# Patient Record
Sex: FEMALE | NOT HISPANIC OR LATINO | URBAN - METROPOLITAN AREA
[De-identification: names, ages, dates, MRNs, and addresses within clinical notes are randomized per-mention and may not be internally consistent; named-entity substitution may affect disease eponyms.]

---

## 2022-05-11 ENCOUNTER — TELEPHONE (OUTPATIENT)
Dept: OBSTETRICS AND GYNECOLOGY | Facility: CLINIC | Age: 37
End: 2022-05-11

## 2022-05-11 NOTE — TELEPHONE ENCOUNTER
1st attempt- left voicemail. Patient being referred by her request. Unsure of reason for needing to be seen. Patient would be a new gyn due to patient never being seen at our office. Please schedule first available.

## 2023-08-14 ENCOUNTER — HOSPITAL ENCOUNTER (OUTPATIENT)
Facility: HOSPITAL | Age: 38
Setting detail: OBSERVATION
Discharge: PSYCHIATRIC OR SUBSTANCE USE FACILITY | DRG: 881 | End: 2023-08-16
Attending: EMERGENCY MEDICINE | Admitting: STUDENT IN AN ORGANIZED HEALTH CARE EDUCATION/TRAINING PROGRAM
Payer: COMMERCIAL

## 2023-08-14 DIAGNOSIS — T50.902A OVERDOSE, INTENTIONAL SELF-HARM, INITIAL ENCOUNTER: Primary | ICD-10-CM

## 2023-08-14 PROCEDURE — 80307 DRUG TEST PRSMV CHEM ANLYZR: CPT | Performed by: NURSE PRACTITIONER

## 2023-08-14 PROCEDURE — 36415 COLL VENOUS BLD VENIPUNCTURE: CPT

## 2023-08-14 PROCEDURE — 99285 EMERGENCY DEPT VISIT HI MDM: CPT

## 2023-08-14 PROCEDURE — 81001 URINALYSIS AUTO W/SCOPE: CPT

## 2023-08-14 PROCEDURE — G0378 HOSPITAL OBSERVATION PER HR: HCPCS

## 2023-08-15 PROBLEM — T50.901A OVERDOSE: Status: ACTIVE | Noted: 2023-08-15

## 2023-08-15 LAB
ALBUMIN SERPL-MCNC: 4.5 G/DL (ref 3.5–5.2)
ALBUMIN/GLOB SERPL: 1.5 G/DL
ALP SERPL-CCNC: 65 U/L (ref 39–117)
ALT SERPL W P-5'-P-CCNC: 14 U/L (ref 1–33)
AMPHET+METHAMPHET UR QL: NEGATIVE
ANION GAP SERPL CALCULATED.3IONS-SCNC: 11.4 MMOL/L (ref 5–15)
ANION GAP SERPL CALCULATED.3IONS-SCNC: 13.7 MMOL/L (ref 5–15)
APAP SERPL-MCNC: <5 MCG/ML (ref 0–30)
AST SERPL-CCNC: 16 U/L (ref 1–32)
BACTERIA UR QL AUTO: ABNORMAL /HPF
BARBITURATES UR QL SCN: NEGATIVE
BASOPHILS # BLD AUTO: 0.04 10*3/MM3 (ref 0–0.2)
BASOPHILS # BLD AUTO: 0.05 10*3/MM3 (ref 0–0.2)
BASOPHILS NFR BLD AUTO: 0.5 % (ref 0–1.5)
BASOPHILS NFR BLD AUTO: 0.7 % (ref 0–1.5)
BENZODIAZ UR QL SCN: NEGATIVE
BILIRUB SERPL-MCNC: 0.3 MG/DL (ref 0–1.2)
BILIRUB UR QL STRIP: NEGATIVE
BUN SERPL-MCNC: 5 MG/DL (ref 6–20)
BUN SERPL-MCNC: 6 MG/DL (ref 6–20)
BUN/CREAT SERPL: 6 (ref 7–25)
BUN/CREAT SERPL: 9.5 (ref 7–25)
CALCIUM SPEC-SCNC: 7.9 MG/DL (ref 8.6–10.5)
CALCIUM SPEC-SCNC: 8.2 MG/DL (ref 8.6–10.5)
CANNABINOIDS SERPL QL: NEGATIVE
CHLORIDE SERPL-SCNC: 105 MMOL/L (ref 98–107)
CHLORIDE SERPL-SCNC: 108 MMOL/L (ref 98–107)
CLARITY UR: CLEAR
CO2 SERPL-SCNC: 21.3 MMOL/L (ref 22–29)
CO2 SERPL-SCNC: 21.6 MMOL/L (ref 22–29)
COCAINE UR QL: NEGATIVE
COLOR UR: YELLOW
CREAT SERPL-MCNC: 0.63 MG/DL (ref 0.57–1)
CREAT SERPL-MCNC: 0.83 MG/DL (ref 0.57–1)
DEPRECATED RDW RBC AUTO: 41.6 FL (ref 37–54)
DEPRECATED RDW RBC AUTO: 43 FL (ref 37–54)
EGFRCR SERPLBLD CKD-EPI 2021: 115.9 ML/MIN/1.73
EGFRCR SERPLBLD CKD-EPI 2021: 92.1 ML/MIN/1.73
EOSINOPHIL # BLD AUTO: 0.1 10*3/MM3 (ref 0–0.4)
EOSINOPHIL # BLD AUTO: 0.1 10*3/MM3 (ref 0–0.4)
EOSINOPHIL NFR BLD AUTO: 1.2 % (ref 0.3–6.2)
EOSINOPHIL NFR BLD AUTO: 1.4 % (ref 0.3–6.2)
ERYTHROCYTE [DISTWIDTH] IN BLOOD BY AUTOMATED COUNT: 12.6 % (ref 12.3–15.4)
ERYTHROCYTE [DISTWIDTH] IN BLOOD BY AUTOMATED COUNT: 12.7 % (ref 12.3–15.4)
ETHANOL BLD-MCNC: 243 MG/DL (ref 0–10)
ETHANOL UR QL: 0.24 %
FENTANYL UR-MCNC: NEGATIVE NG/ML
GLOBULIN UR ELPH-MCNC: 3.1 GM/DL
GLUCOSE SERPL-MCNC: 104 MG/DL (ref 65–99)
GLUCOSE SERPL-MCNC: 98 MG/DL (ref 65–99)
GLUCOSE UR STRIP-MCNC: NEGATIVE MG/DL
HCG INTACT+B SERPL-ACNC: <0.5 MIU/ML
HCT VFR BLD AUTO: 39.4 % (ref 34–46.6)
HCT VFR BLD AUTO: 39.7 % (ref 34–46.6)
HGB BLD-MCNC: 13.7 G/DL (ref 12–15.9)
HGB BLD-MCNC: 13.9 G/DL (ref 12–15.9)
HGB UR QL STRIP.AUTO: NEGATIVE
HOLD SPECIMEN: NORMAL
HOLD SPECIMEN: NORMAL
HYALINE CASTS UR QL AUTO: ABNORMAL /LPF
IMM GRANULOCYTES # BLD AUTO: 0.02 10*3/MM3 (ref 0–0.05)
IMM GRANULOCYTES # BLD AUTO: 0.03 10*3/MM3 (ref 0–0.05)
IMM GRANULOCYTES NFR BLD AUTO: 0.2 % (ref 0–0.5)
IMM GRANULOCYTES NFR BLD AUTO: 0.4 % (ref 0–0.5)
KETONES UR QL STRIP: NEGATIVE
LEUKOCYTE ESTERASE UR QL STRIP.AUTO: ABNORMAL
LYMPHOCYTES # BLD AUTO: 3.05 10*3/MM3 (ref 0.7–3.1)
LYMPHOCYTES # BLD AUTO: 3.52 10*3/MM3 (ref 0.7–3.1)
LYMPHOCYTES NFR BLD AUTO: 37.6 % (ref 19.6–45.3)
LYMPHOCYTES NFR BLD AUTO: 49.4 % (ref 19.6–45.3)
MAGNESIUM SERPL-MCNC: 2 MG/DL (ref 1.6–2.6)
MCH RBC QN AUTO: 31.8 PG (ref 26.6–33)
MCH RBC QN AUTO: 32.3 PG (ref 26.6–33)
MCHC RBC AUTO-ENTMCNC: 34.5 G/DL (ref 31.5–35.7)
MCHC RBC AUTO-ENTMCNC: 35.3 G/DL (ref 31.5–35.7)
MCV RBC AUTO: 91.4 FL (ref 79–97)
MCV RBC AUTO: 92.1 FL (ref 79–97)
METHADONE UR QL SCN: NEGATIVE
MONOCYTES # BLD AUTO: 0.4 10*3/MM3 (ref 0.1–0.9)
MONOCYTES # BLD AUTO: 0.49 10*3/MM3 (ref 0.1–0.9)
MONOCYTES NFR BLD AUTO: 4.9 % (ref 5–12)
MONOCYTES NFR BLD AUTO: 6.9 % (ref 5–12)
NEUTROPHILS NFR BLD AUTO: 2.93 10*3/MM3 (ref 1.7–7)
NEUTROPHILS NFR BLD AUTO: 4.5 10*3/MM3 (ref 1.7–7)
NEUTROPHILS NFR BLD AUTO: 41.2 % (ref 42.7–76)
NEUTROPHILS NFR BLD AUTO: 55.6 % (ref 42.7–76)
NITRITE UR QL STRIP: NEGATIVE
NRBC BLD AUTO-RTO: 0 /100 WBC (ref 0–0.2)
NRBC BLD AUTO-RTO: 0 /100 WBC (ref 0–0.2)
OPIATES UR QL: NEGATIVE
OXYCODONE UR QL SCN: NEGATIVE
PH UR STRIP.AUTO: 5.5 [PH] (ref 5–8)
PLATELET # BLD AUTO: 253 10*3/MM3 (ref 140–450)
PLATELET # BLD AUTO: 255 10*3/MM3 (ref 140–450)
PMV BLD AUTO: 10.1 FL (ref 6–12)
PMV BLD AUTO: 10.3 FL (ref 6–12)
POTASSIUM SERPL-SCNC: 3.9 MMOL/L (ref 3.5–5.2)
POTASSIUM SERPL-SCNC: 4 MMOL/L (ref 3.5–5.2)
PROT SERPL-MCNC: 7.6 G/DL (ref 6–8.5)
PROT UR QL STRIP: NEGATIVE
QT INTERVAL: 403 MS
QT INTERVAL: 416 MS
RBC # BLD AUTO: 4.31 10*6/MM3 (ref 3.77–5.28)
RBC # BLD AUTO: 4.31 10*6/MM3 (ref 3.77–5.28)
RBC # UR STRIP: ABNORMAL /HPF
REF LAB TEST METHOD: ABNORMAL
SALICYLATES SERPL-MCNC: <0.3 MG/DL
SODIUM SERPL-SCNC: 140 MMOL/L (ref 136–145)
SODIUM SERPL-SCNC: 141 MMOL/L (ref 136–145)
SP GR UR STRIP: <=1.005 (ref 1–1.03)
SQUAMOUS #/AREA URNS HPF: ABNORMAL /HPF
UROBILINOGEN UR QL STRIP: ABNORMAL
WBC # UR STRIP: ABNORMAL /HPF
WBC NRBC COR # BLD: 7.12 10*3/MM3 (ref 3.4–10.8)
WBC NRBC COR # BLD: 8.11 10*3/MM3 (ref 3.4–10.8)
WHOLE BLOOD HOLD COAG: NORMAL
WHOLE BLOOD HOLD SPECIMEN: NORMAL

## 2023-08-15 PROCEDURE — 85025 COMPLETE CBC W/AUTO DIFF WBC: CPT | Performed by: STUDENT IN AN ORGANIZED HEALTH CARE EDUCATION/TRAINING PROGRAM

## 2023-08-15 PROCEDURE — 85025 COMPLETE CBC W/AUTO DIFF WBC: CPT | Performed by: NURSE PRACTITIONER

## 2023-08-15 PROCEDURE — G0378 HOSPITAL OBSERVATION PER HR: HCPCS

## 2023-08-15 PROCEDURE — 80053 COMPREHEN METABOLIC PANEL: CPT | Performed by: NURSE PRACTITIONER

## 2023-08-15 PROCEDURE — 93010 ELECTROCARDIOGRAM REPORT: CPT | Performed by: INTERNAL MEDICINE

## 2023-08-15 PROCEDURE — 96360 HYDRATION IV INFUSION INIT: CPT

## 2023-08-15 PROCEDURE — 83735 ASSAY OF MAGNESIUM: CPT | Performed by: STUDENT IN AN ORGANIZED HEALTH CARE EDUCATION/TRAINING PROGRAM

## 2023-08-15 PROCEDURE — 82077 ASSAY SPEC XCP UR&BREATH IA: CPT | Performed by: NURSE PRACTITIONER

## 2023-08-15 PROCEDURE — 93005 ELECTROCARDIOGRAM TRACING: CPT | Performed by: STUDENT IN AN ORGANIZED HEALTH CARE EDUCATION/TRAINING PROGRAM

## 2023-08-15 PROCEDURE — 99222 1ST HOSP IP/OBS MODERATE 55: CPT | Performed by: STUDENT IN AN ORGANIZED HEALTH CARE EDUCATION/TRAINING PROGRAM

## 2023-08-15 PROCEDURE — 80179 DRUG ASSAY SALICYLATE: CPT | Performed by: NURSE PRACTITIONER

## 2023-08-15 PROCEDURE — 80143 DRUG ASSAY ACETAMINOPHEN: CPT | Performed by: NURSE PRACTITIONER

## 2023-08-15 PROCEDURE — 84702 CHORIONIC GONADOTROPIN TEST: CPT | Performed by: EMERGENCY MEDICINE

## 2023-08-15 PROCEDURE — 25010000002 CALCIUM GLUCONATE-NACL 1-0.675 GM/50ML-% SOLUTION: Performed by: INTERNAL MEDICINE

## 2023-08-15 PROCEDURE — 96361 HYDRATE IV INFUSION ADD-ON: CPT

## 2023-08-15 RX ORDER — AMOXICILLIN 250 MG
2 CAPSULE ORAL 2 TIMES DAILY
Status: DISCONTINUED | OUTPATIENT
Start: 2023-08-15 | End: 2023-08-16 | Stop reason: HOSPADM

## 2023-08-15 RX ORDER — LORAZEPAM 2 MG/ML
1 INJECTION INTRAMUSCULAR
Status: DISCONTINUED | OUTPATIENT
Start: 2023-08-15 | End: 2023-08-16 | Stop reason: HOSPADM

## 2023-08-15 RX ORDER — SODIUM CHLORIDE 0.9 % (FLUSH) 0.9 %
10 SYRINGE (ML) INJECTION AS NEEDED
Status: DISCONTINUED | OUTPATIENT
Start: 2023-08-15 | End: 2023-08-16 | Stop reason: HOSPADM

## 2023-08-15 RX ORDER — POLYETHYLENE GLYCOL 3350 17 G/17G
17 POWDER, FOR SOLUTION ORAL DAILY PRN
Status: DISCONTINUED | OUTPATIENT
Start: 2023-08-15 | End: 2023-08-16 | Stop reason: HOSPADM

## 2023-08-15 RX ORDER — LORAZEPAM 2 MG/ML
2 INJECTION INTRAMUSCULAR
Status: DISCONTINUED | OUTPATIENT
Start: 2023-08-15 | End: 2023-08-16 | Stop reason: HOSPADM

## 2023-08-15 RX ORDER — LORAZEPAM 2 MG/1
2 TABLET ORAL
Status: DISCONTINUED | OUTPATIENT
Start: 2023-08-15 | End: 2023-08-16 | Stop reason: HOSPADM

## 2023-08-15 RX ORDER — CALCIUM GLUCONATE 20 MG/ML
1000 INJECTION, SOLUTION INTRAVENOUS ONCE
Status: COMPLETED | OUTPATIENT
Start: 2023-08-15 | End: 2023-08-15

## 2023-08-15 RX ORDER — BISACODYL 5 MG/1
5 TABLET, DELAYED RELEASE ORAL DAILY PRN
Status: DISCONTINUED | OUTPATIENT
Start: 2023-08-15 | End: 2023-08-16 | Stop reason: HOSPADM

## 2023-08-15 RX ORDER — CLONAZEPAM 1 MG/1
1 TABLET ORAL 2 TIMES DAILY PRN
COMMUNITY
End: 2023-08-16 | Stop reason: HOSPADM

## 2023-08-15 RX ORDER — SODIUM CHLORIDE 9 MG/ML
40 INJECTION, SOLUTION INTRAVENOUS AS NEEDED
Status: DISCONTINUED | OUTPATIENT
Start: 2023-08-15 | End: 2023-08-16 | Stop reason: HOSPADM

## 2023-08-15 RX ORDER — BISACODYL 10 MG
10 SUPPOSITORY, RECTAL RECTAL DAILY PRN
Status: DISCONTINUED | OUTPATIENT
Start: 2023-08-15 | End: 2023-08-16 | Stop reason: HOSPADM

## 2023-08-15 RX ORDER — CHOLECALCIFEROL (VITAMIN D3) 125 MCG
5 CAPSULE ORAL NIGHTLY PRN
Status: DISCONTINUED | OUTPATIENT
Start: 2023-08-15 | End: 2023-08-16 | Stop reason: HOSPADM

## 2023-08-15 RX ORDER — NITROGLYCERIN 0.4 MG/1
0.4 TABLET SUBLINGUAL
Status: DISCONTINUED | OUTPATIENT
Start: 2023-08-15 | End: 2023-08-16 | Stop reason: HOSPADM

## 2023-08-15 RX ORDER — SODIUM CHLORIDE 0.9 % (FLUSH) 0.9 %
10 SYRINGE (ML) INJECTION EVERY 12 HOURS SCHEDULED
Status: DISCONTINUED | OUTPATIENT
Start: 2023-08-15 | End: 2023-08-16 | Stop reason: HOSPADM

## 2023-08-15 RX ORDER — ALUMINA, MAGNESIA, AND SIMETHICONE 2400; 2400; 240 MG/30ML; MG/30ML; MG/30ML
15 SUSPENSION ORAL EVERY 6 HOURS PRN
Status: DISCONTINUED | OUTPATIENT
Start: 2023-08-15 | End: 2023-08-16 | Stop reason: SDUPTHER

## 2023-08-15 RX ORDER — LORAZEPAM 0.5 MG/1
1 TABLET ORAL
Status: DISCONTINUED | OUTPATIENT
Start: 2023-08-15 | End: 2023-08-16 | Stop reason: HOSPADM

## 2023-08-15 RX ORDER — PROPRANOLOL HYDROCHLORIDE 40 MG/1
40 TABLET ORAL 2 TIMES DAILY
COMMUNITY
End: 2023-08-16 | Stop reason: HOSPADM

## 2023-08-15 RX ORDER — ACETAMINOPHEN 325 MG/1
650 TABLET ORAL EVERY 6 HOURS PRN
Status: DISCONTINUED | OUTPATIENT
Start: 2023-08-15 | End: 2023-08-16 | Stop reason: SDUPTHER

## 2023-08-15 RX ORDER — SODIUM CHLORIDE 9 MG/ML
100 INJECTION, SOLUTION INTRAVENOUS CONTINUOUS
Status: ACTIVE | OUTPATIENT
Start: 2023-08-15 | End: 2023-08-16

## 2023-08-15 RX ADMIN — SODIUM CHLORIDE 1000 ML: 9 INJECTION, SOLUTION INTRAVENOUS at 04:21

## 2023-08-15 RX ADMIN — CALCIUM GLUCONATE 1000 MG: 20 INJECTION, SOLUTION INTRAVENOUS at 09:54

## 2023-08-15 RX ADMIN — SODIUM CHLORIDE 100 ML/HR: 9 INJECTION, SOLUTION INTRAVENOUS at 05:42

## 2023-08-15 RX ADMIN — SODIUM CHLORIDE 100 ML/HR: 9 INJECTION, SOLUTION INTRAVENOUS at 15:58

## 2023-08-15 NOTE — PLAN OF CARE
Goal Outcome Evaluation:  Plan of Care Reviewed With: patient        Progress: no change       Patient alert and oriented. Denies suicide ideations at this time. Reports knowing she needs help with her anxiety and depression. Sitter at bedside.

## 2023-08-15 NOTE — H&P
[facility]   HOSPITALIST HISTORY AND PHYSICAL  Date: 8/15/2023   Patient Name: Daiana Macdonald  : 1985  MRN: [id number]  Primary Care Physician:  Provider, No Known  Date of admission: 2023    Subjective   Subjective     Chief Complaint: Overdose    HPI:    Daiana Macdonald is a 38 y.o. female past medical history of severe anxiety and depression who presents to the ER after attempted suicide.  Patient was drinking alcohol earlier today with the father of her children and suddenly had worsening depression and suicidality that led to her to take an unclear amount of her propranolol which she takes for anxiety.  She estimates roughly 30.  She is unclear if this was a clear suicide attempt but came to the ER afterwards for evaluation.  Upon arrival she is hemodynamically stable and initially normotensive.  Poison control was contacted by ER staff who advised observation if she had any hypotension.  She developed hypotension while in the ER and the medicine service was subsequently contacted for observation.  At the time my exam patient is resting comfortably in bed.  Denies any active suicidality at this time.  She is currently on psychiatric hold.  Denies having any chest pain or palpitations.  States that she has been having worsening depression for the last few months since her last psychiatrist appointment.  She has been weaned off Zoloft and onto Pristiq but Pristiq was not controlling her depression so she has recently been weaned off Pristiq as well.  While being weaned off Pristiq she did not have alternative antidepressant started and it seems in that time she has been having worsening symptoms of depression.      Personal History     Past Medical History:  No past medical history on file.      Past Surgical History:  No past surgical history on file.      Family History:   Reviewed and noncontributory except as mentioned in HPI    Social History:   Social Determinants of Health     Tobacco Use:  Not on file   Alcohol Use: Not on file   Financial Resource Strain: Not on file   Food Insecurity: Not on file   Transportation Needs: Not on file   Physical Activity: Not on file   Stress: Not on file   Social Connections: Not on file   Intimate Partner Violence: Not on file   Depression: Not on file   Housing Stability: Not on file         Home Medications:       Allergies:  No Known Allergies    Review of Systems   All systems were reviewed and negative except for: Depression, suicidality    Objective   Objective     Vitals:   Temp:  [98.6 øF (37 øC)] 98.6 øF (37 øC)  Heart Rate:  [79-87] 87  Resp:  [18] 18  BP: ()/(58-85) 95/64    Physical Exam    Constitutional: Awake, alert, no acute distress   Eyes: Pupils equal, sclerae anicteric, no conjunctival injection   HENT: NCAT, mucous membranes moist   Neck: Supple, no thyromegaly, no lymphadenopathy, trachea midline   Respiratory: Clear to auscultation bilaterally, nonlabored respirations    Cardiovascular: RRR, no murmurs, rubs, or gallops, palpable pedal pulses bilaterally   Gastrointestinal: Positive bowel sounds, soft, nontender, nondistended   Musculoskeletal: No bilateral ankle edema, no clubbing or cyanosis to extremities   Psychiatric: Appropriate affect, cooperative   Neurologic: Oriented x 3, strength symmetric in all extremities, Cranial Nerves grossly intact to confrontation, speech clear   Skin: No rashes     Result Review    Result Review:  I have personally reviewed the results from the time of this admission to 8/15/2023 03:38 EDT and agree with these findings:  [x]  Laboratory  [x]  Microbiology  [x]  Radiology  [x]  EKG/Telemetry   [x]  Cardiology/Vascular   []  Pathology  [x]  Old records  []  Other:      Assessment & Plan   Assessment / Plan     Assessment/Plan:   Propranolol overdose  Attempted suicide  Severe anxiety/depression    Plan  -Place in observation on the hospitalist service  - Discussed with poison control who advised  observation of heart rate and blood pressure.  Unclear exactly how much she took but does not have any signs of significant toxicity at this time of beta-blocker.  Blood pressure remained stable.  We will continue to monitor.  Check EKG  - Psychiatry consultation for suicidality and severe depression for medication adjustment      Discussed with ER Physician and Nurse    All labs/imaging studies were personally reviewed and findings are as noted above      DVT Prophylaxis: SCDs    CODE STATUS:    Code Status (Patient has no pulse and is not breathing): CPR (Attempt to Resuscitate)  Medical Interventions (Patient has pulse or is breathing): Full Support      Admission Status:  I believe this patient meets observation status.    Electronically signed by Rony Wilson MD, 08/15/23, 3:38 AM EDT.

## 2023-08-15 NOTE — ED NOTES
Contacted poison control r/t decreased blood pressure.  Medical admit suggested r/t hypotension.

## 2023-08-15 NOTE — ED PROVIDER NOTES
"Time: 2:13 AM EDT  Date of encounter:  8/14/2023  Independent Historian/Clinical History and Information was obtained by:   Patient    History is limited by: N/A    Chief Complaint: Overdose      History of Present Illness:  Patient is a 38 y.o. year old female who presents to the emergency department for evaluation of Overdose.  Patient does not want to go into details of why she overdosed.  She does report some depression.  Patient states around 10:30 she took unknown amount of propanolol.  She denies any headache, chest pain, fever, chills, cough, congestion.  Patient also admits to drinking alcohol.  She says she drink about a bottle of wine.    HPI    Patient Care Team  Primary Care Provider: Provider, No Known    Past Medical History:     No Known Allergies  No past medical history on file.  No past surgical history on file.  No family history on file.    Home Medications:  Prior to Admission medications    Not on File        Social History:          Review of Systems:  Review of Systems   Constitutional:  Negative for chills and fever.   HENT:  Negative for congestion, ear pain and sore throat.    Eyes:  Negative for pain.   Respiratory:  Negative for cough, chest tightness and shortness of breath.    Cardiovascular:  Negative for chest pain.   Gastrointestinal:  Negative for abdominal pain, diarrhea, nausea and vomiting.   Genitourinary:  Negative for flank pain and hematuria.   Musculoskeletal:  Negative for joint swelling.   Skin:  Negative for pallor.   Neurological:  Negative for seizures and headaches.   Psychiatric/Behavioral:  Positive for suicidal ideas.    All other systems reviewed and are negative.     Physical Exam:  /94   Pulse 90   Temp 98.6 øF (37 øC) (Oral)   Resp 18   Ht 162.6 cm (64\")   Wt 66.1 kg (145 lb 11.6 oz)   LMP 08/08/2023 (Approximate)   SpO2 96%   BMI 25.01 kg/mý     Physical Exam  Constitutional:       Appearance: Normal appearance.   HENT:      Head: Normocephalic " and atraumatic.      Nose: Nose normal.      Mouth/Throat:      Mouth: Mucous membranes are moist.   Eyes:      Extraocular Movements: Extraocular movements intact.      Conjunctiva/sclera: Conjunctivae normal.      Pupils: Pupils are equal, round, and reactive to light.   Cardiovascular:      Rate and Rhythm: Normal rate and regular rhythm.      Pulses: Normal pulses.      Heart sounds: Normal heart sounds.   Pulmonary:      Effort: Pulmonary effort is normal.      Breath sounds: Normal breath sounds.   Abdominal:      General: There is no distension.      Palpations: Abdomen is soft.      Tenderness: There is no abdominal tenderness.   Musculoskeletal:         General: Normal range of motion.      Cervical back: Normal range of motion.   Skin:     General: Skin is warm and dry.      Capillary Refill: Capillary refill takes less than 2 seconds.   Neurological:      General: No focal deficit present.      Mental Status: She is alert and oriented to person, place, and time. Mental status is at baseline.   Psychiatric:         Mood and Affect: Mood normal.         Behavior: Behavior normal.                Procedures:  Procedures      Medical Decision Making:      Comorbidities that affect care:    Depression    External Notes reviewed:    Hospital Discharge Summary: Patient was admitted for delivery and discharged on 1/30/2019.      The following orders were placed and all results were independently analyzed by me:  Orders Placed This Encounter   Procedures    Urinalysis With Microscopic If Indicated (No Culture) - Urine, Clean Catch    Urinalysis, Microscopic Only - Urine, Clean Catch    Adrian Draw    Comprehensive Metabolic Panel    Acetaminophen Level    Ethanol    Salicylate Level    Urine Drug Screen - Urine, Clean Catch    CBC Auto Differential    hCG, Quantitative, Pregnancy    NPO Diet NPO Type: Strict NPO    Vital Signs    Continuous Pulse Oximetry    Contact poison control    Code Status and Medical  Interventions:    Inpatient Hospitalist Consult    Oxygen Therapy- Nasal Cannula; Titrate 1-6 LPM Per SpO2; 90 - 95%    POC Glucose Once    ECG 12 Lead Rhythm Change    Insert Peripheral IV    Initiate Observation Status    Legal Status 72 Hour Hold    CBC & Differential    Green Top (Gel)    Lavender Top    Gold Top - SST    Light Blue Top       Medications Given in the Emergency Department:  Medications   sodium chloride 0.9 % flush 10 mL (has no administration in time range)   sodium chloride 0.9 % bolus 1,000 mL (has no administration in time range)        ED Course:         Labs:    Lab Results (last 24 hours)       Procedure Component Value Units Date/Time    Urinalysis With Microscopic If Indicated (No Culture) - Urine, Clean Catch [[id number]]  (Abnormal) Collected: 08/14/23 2347    Specimen: Urine, Clean Catch Updated: 08/15/23 0002     Color, UA Yellow     Appearance, UA Clear     pH, UA 5.5     Specific Gravity, UA <=1.005     Glucose, UA Negative     Ketones, UA Negative     Bilirubin, UA Negative     Blood, UA Negative     Protein, UA Negative     Leuk Esterase, UA Trace     Nitrite, UA Negative     Urobilinogen, UA 0.2 E.U./dL    Urinalysis, Microscopic Only - Urine, Clean Catch [[id number]]  (Abnormal) Collected: 08/14/23 2347    Specimen: Urine, Clean Catch Updated: 08/15/23 0011     RBC, UA 0-2 /HPF      WBC, UA 0-2 /HPF      Bacteria, UA None Seen /HPF      Squamous Epithelial Cells, UA 0-2 /HPF      Hyaline Casts, UA None Seen /LPF      Methodology Manual Light Microscopy    Urine Drug Screen - Urine, Clean Catch [[id number]]  (Normal) Collected: 08/14/23 2347    Specimen: Urine, Clean Catch Updated: 08/15/23 0024     Amphet/Methamphet, Screen Negative     Barbiturates Screen, Urine Negative     Benzodiazepine Screen, Urine Negative     Cocaine Screen, Urine Negative     Opiate Screen Negative     THC, Screen, Urine Negative     Methadone Screen, Urine Negative     Oxycodone Screen, Urine Negative      Fentanyl, Urine Negative    Narrative:      Negative Thresholds Per Drugs Screened:    Amphetamines                 500 ng/ml  Barbiturates                 200 ng/ml  Benzodiazepines              100 ng/ml  Cocaine                      300 ng/ml  Methadone                    300 ng/ml  Opiates                      300 ng/ml  Oxycodone                    100 ng/ml  THC                           50 ng/ml  Fentanyl                       5 ng/ml      The Normal Value for all drugs tested is negative. This report includes final unconfirmed screening results to be used for medical treatment purposes only. Unconfirmed results must not be used for non-medical purposes such as employment or legal testing. Clinical consideration should be applied to any drug of abuse test, particularly when unconfirmed results are used.            CBC & Differential [[id number]]  (Abnormal) Collected: 08/15/23 0149    Specimen: Blood Updated: 08/15/23 0158    Narrative:      The following orders were created for panel order CBC & Differential.  Procedure                               Abnormality         Status                     ---------                               -----------         ------                     CBC Auto Differential[[id number]]        Abnormal            Final result                 Please view results for these tests on the individual orders.    Comprehensive Metabolic Panel [[id number]]  (Abnormal) Collected: 08/15/23 0149    Specimen: Blood Updated: 08/15/23 0218     Glucose 98 mg/dL      BUN 5 mg/dL      Creatinine 0.83 mg/dL      Sodium 140 mmol/L      Potassium 3.9 mmol/L      Chloride 105 mmol/L      CO2 21.3 mmol/L      Calcium 8.2 mg/dL      Total Protein 7.6 g/dL      Albumin 4.5 g/dL      ALT (SGPT) 14 U/L      AST (SGOT) 16 U/L      Alkaline Phosphatase 65 U/L      Total Bilirubin 0.3 mg/dL      Globulin 3.1 gm/dL      A/G Ratio 1.5 g/dL      BUN/Creatinine Ratio 6.0     Anion Gap 13.7 mmol/L      eGFR 92.1  mL/min/1.73     Narrative:      GFR Normal >60  Chronic Kidney Disease <60  Kidney Failure <15      Acetaminophen Level [[id number]]  (Normal) Collected: 08/15/23 0149    Specimen: Blood Updated: 08/15/23 0218     Acetaminophen <5.0 mcg/mL     Ethanol [[id number]]  (Abnormal) Collected: 08/15/23 0149    Specimen: Blood Updated: 08/15/23 0218     Ethanol 243 mg/dL      Ethanol % 0.243 %     Narrative:      Ethanol (Plasma)  <10 Essentially Negative    Toxic Concentrations           mg/dL    Flushing, slowing of reflexes    Impaired visual activity         Depression of CNS              >100  Possible Coma                  >300       Salicylate Level [[id number]]  (Normal) Collected: 08/15/23 0149    Specimen: Blood Updated: 08/15/23 0218     Salicylate <0.3 mg/dL     CBC Auto Differential [[id number]]  (Abnormal) Collected: 08/15/23 0149    Specimen: Blood Updated: 08/15/23 0158     WBC 8.11 10*3/mm3      RBC 4.31 10*6/mm3      Hemoglobin 13.9 g/dL      Hematocrit 39.4 %      MCV 91.4 fL      MCH 32.3 pg      MCHC 35.3 g/dL      RDW 12.6 %      RDW-SD 41.6 fl      MPV 10.1 fL      Platelets 253 10*3/mm3      Neutrophil % 55.6 %      Lymphocyte % 37.6 %      Monocyte % 4.9 %      Eosinophil % 1.2 %      Basophil % 0.5 %      Immature Grans % 0.2 %      Neutrophils, Absolute 4.50 10*3/mm3      Lymphocytes, Absolute 3.05 10*3/mm3      Monocytes, Absolute 0.40 10*3/mm3      Eosinophils, Absolute 0.10 10*3/mm3      Basophils, Absolute 0.04 10*3/mm3      Immature Grans, Absolute 0.02 10*3/mm3      nRBC 0.0 /100 WBC     hCG, Quantitative, Pregnancy [[id number]] Collected: 08/15/23 0149    Specimen: Blood Updated: 08/15/23 0225     HCG Quantitative <0.50 mIU/mL     Narrative:      HCG Ranges by Gestational Age    Females - non-pregnant premenopausal   </= 1mIU/mL HCG  Females - postmenopausal               </= 7mIU/mL HCG    3 Weeks       5.4   -      72 mIU/mL  4 Weeks      10.2   -     708 mIU/mL  5 Weeks       217    -   8,245 mIU/mL  6 Weeks       152   -  32,177 mIU/mL  7 Weeks     4,059   - 153,767 mIU/mL  8 Weeks    31,366   - 149,094 mIU/mL  9 Weeks    59,109   - 135,901 mIU/mL  10 Weeks   44,186   - 170,409 mIU/mL  12 Weeks   27,107   - 201,615 mIU/mL  14 Weeks   24,302   -  93,646 mIU/mL  15 Weeks   12,540   -  69,747 mIU/mL  16 Weeks    8,904   -  55,332 mIU/mL  17 Weeks    8,240   -  51,793 mIU/mL  18 Weeks    9,649   -  55,271 mIU/mL               Imaging:    No Radiology Exams Resulted Within Past 24 Hours      Differential Diagnosis and Discussion:    Psychiatric: Differential diagnosis includes but is not limited to depression, psychosis, bipolar disorder, anxiety, manic episode, schizophrenia, and substance abuse.    All labs were reviewed and interpreted by me.    MDM  Number of Diagnoses or Management Options  Diagnosis management comments: Patient presents emergency department after intentional overdose.  Patient took amount amount of propanolol.  She also admits to alcohol use.  Labs were obtained showed no significant abnormality.  Patient was discussed with poison control recommended admission for close monitoring.  Discussed patient with hospitalist and she will be admitted for further care.         Critical Care Note: Total Critical Care time of 35 minutes. Total critical care time documented does not include time spent on separately billed procedures for services of nurses or physician assistants. I personally saw and examined the patient. I have reviewed all diagnostic interpretations and treatment plans as written. I was present for the key portions of any procedures performed and the inclusive time noted in any critical care statement. Critical care time includes patient management by me, time spent at the patients bedside,  time to review lab and imaging results, discussing patient care, documentation in the medical record, and time spent with family or caregiver.    Patient Care Considerations:    CT  HEAD: I considered ordering a noncontrast CT of the head, however patient is alert and oriented with no focal neurological deficits      Consultants/Shared Management Plan:    Hospitalist: I have discussed the case with Dr Wilson who agrees to accept the patient for admission.    Social Determinants of Health:    Patient is independent, reliable, and has access to care.       Disposition and Care Coordination:    Admit:   Through independent evaluation of the patient's history, physical, and imperical data, the patient meets criteria for observation/admission to the hospital.        Final diagnoses:   Overdose, intentional self-harm, initial encounter        ED Disposition       ED Disposition   Decision to Admit    Condition   --    Comment   Level of Care: Telemetry [5]   Diagnosis: Overdose [202577]   Admitting Physician: ROYER WILSON [[id number]]   Attending Physician: ROYER WILSON [[id number]]                 This medical record created using voice recognition software.             Rik Gomez MD  08/15/23 0343

## 2023-08-15 NOTE — ED NOTES
Contacted poison control r/t alcohol and propanolol ingestion.  Poison control suggested cardiac monitoring, EKG CBC, CMP, Acetaminophen level, ethanol, salicylate level and urine drug screen.  Monitoring for a minimum of 8 hours due to delayed effects of medication.

## 2023-08-15 NOTE — CONSULTS
" [facility]   PSYCHIATRIC CONSULTATION    Patient Name: Daiana Macdonald  : 1985  MRN: [id number]  Primary Care Physician:  Provider, No Known  Date of admission: 2023    Referring Provider: Rony Wilson  Reason for Consultation: \"possible suicidality\"    Subjective   Subjective     Chief Complaint: \"Im ok. I was just stressed.\"    HPI:     Daiana Macdonald is a 38 y.o. female  who was admitted to medical floor 8/15/23 after presenting to ED after attempted suicide by overdose. Patient was drinking alcohol with the father of her children and reported a sudden worsening of depression and suicidality. She states the two were \"just ridin' around.\" States that she was not driving. States they parked and she suddenly took \"about 30 propranolol\". States she was prescribed propranolol by \"the couch in [place].\" She cannot elaborate as to the circumstances that led up to the OD. She denies her and her childrens father were fighting. States she took the medication in front of her childrens father and that he immediately called 911 and ambulance arrived and transported her to hospital. She states to this writer that she believes this was a suicide attempt but states she is not sure. She states \"I dont know\" when asked if she wanted to end life. She remains vague and guarded throughout the assessment. Her recollection of the events that led up to hospitalization are at times inconsistent. Her susanne father is in room and she requests he not contribute to history. This writer asked the gentleman to leave room but patient provided no additional details. States \"I trust him, I feel safe with him\". She denies any physical, sexual or emotional abuse. States \"I have known him for 6 years.\" Reiterates that they were not fighting and refuses to elaborate further on her stressors.     Reports depressed mood, anxiety, denies hopelessness. Reports sleep and appetite fair. Denies manic symptoms and symptoms of " "psychosis. Denies current irritability, denies anger or aggression.     Patient does acknowledge that she feels depression worsened after med change. States she felt like zoloft worked for some time and then \"I just got used to it.\" States her prescribing provider stopped zoloft and started her on pristique several weeks ago. States \"it made me irritable, I didn't like it.\" States her provider stopped the medication. She states she was not started on alternative. She is open to starting alternative SSRI today. States she would be open to resuming zoloft as she does not feel she was titrated to maximum dose. Denies adverse effects from zoloft. States she is prescribed propranolol and klonpin for anxiety and feels they are effective.     Denies drug use. Reports vape and tobacco 1/2ppd. Reports that she drinks alcohol \"every weekend or every other weekend\" and states usually she drinks \"a couple glasses of wine and michelob ultra.\" States she drank more than normal prior to hospitalization but does not elaborate. Denies history of complicated withdrawal. Reports history of DUI. Denies cravings. States \"I dont have to drink I can quit when I want to.\" Denies interest in pharmacalogical or other substance use treatment.     Per ED note patient was hemodynamically stable on arrival. She was \"initially normotensive\" but developed hypotension in ED. Poison control contacted and advised observation. Patient was placed on psychiatric hold and admitted to medicine service and psychiatry consulted due to suicide attempt. Patient denied SI in ED and on arrival to floor. 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 Reports that she has hope for future but remains guarded overall and is limited historian as result of her guardedness.         Review of SystemsA complete 14 point ROS was completed and negative aside " "from pertinent positives in HPI above.     Personal History     Past Medical History:   Diagnosis Date    Anxiety     Depression     Hypertension        Past Surgical History:   Procedure Laterality Date    FOOT TENDON SURGERY         Past Psychiatric History: Reports previous hospitalization at [facility] in [place]. Denies history of suicide attempts. Denies history of self injurious behavior. Reports past trials with pristique and zoloft, propranolol, and klonopin. cannot recall other trials.        Family History: family history is not on file. Otherwise pertinent FHx was reviewed and not pertinent to current issue.Reports menal illness on dads side of family. Denies family history of suicide. Reports substance use on dads side of family.     Social History:   Denies  experience. Reports Mandaeism preference [employer]. Denies pending legal charges. Past DUI. Denies she is on probation or parole. Reports obtained associates degree. 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 Born in [place], raised in [place]. Reports childhood was \"good.\" Denies history of physical sexual or emotional abuse. Reports that she is living with her parents in [place]. States that she has 3 children. Denies she is .     Social History     Socioeconomic History    Marital status: Single   Tobacco Use    Smoking status: Every Day     Packs/day: 0.50     Types: Cigarettes    Smokeless tobacco: Never   Vaping Use    Vaping Use: Never used   Substance and Sexual Activity    Alcohol use: Yes     Alcohol/week: 4.0 standard drinks     Types: 4 Glasses of wine per week    Drug use: Never    Sexual activity: Defer       Substance Abuse History: reports that she has been smoking cigarettes. She has been smoking an average of .5 packs per day. She has never used smokeless tobacco. She reports current alcohol use of about 4.0 standard drinks per week. She reports that she does not use drugs.Please see HPI for additional " details. Denies overuse of prescription meds including clonazepam.    Home Medications:  clonazePAM, propranolol, and sertraline      Allergies:  No Known Allergies    Objective   Objective     Vitals:   Temp:  [97.7 øF (36.5 øC)-98.6 øF (37 øC)] 98.1 øF (36.7 øC)  Heart Rate:  [72-90] 80  Resp:  [18] 18  BP: ()/(58-94) 136/83  Physical Exam   Per primary team.    Mental Status Exam:     Hygiene:   fair  Cooperation:  Evasive  Eye Contact:  Downcast  Psychomotor Behavior:  Appropriate  Mood: depressed, anxious, stressed  Affect:  depressed, guarded, evasive, constricted in range, at times inappropriate smile and laughter noted that is incongruent to stated mood  Speech:normal rate, depressed tone, normal volume  Language: english, fluent   Thought Process:  linear and goal directed  Thought Content:  no delusional content noted , no preoccupations  Suicidal:  denies  Homicidal: denies  Hallucinations:  denies auditory, visual and tactile hallucinations  Delusion:  none noted   Memory:  grossly in tact to past history and recent events   Orientation:  name, hospital, city, state, day of week  Reliability:  poor  Insight:  poor  Judgement:  limited  Impulse Control:  fair at present but recently poor ; overall limited     Result Review    Result Review:  I have personally reviewed the results from the time of this admission to 8/15/2023 15:24 EDT and agree with these findings:  [x]  Laboratory  []  Microbiology  []  Radiology  [x]  EKG/Telemetry   []  Cardiology/Vascular   []  Pathology  []  Old records  []  Other:  Most notable findings include:  on admission; pregnancy screen less than 0.5; EKG unremarkable; UDS showed no substances of abuse tested were present     Assessment & Plan   Assessment / Plan       Active Hospital Problems:  Active Hospital Problems    Diagnosis     **Overdose    Depressive Disorder, unspecified   Alcohol Use Disorder, intermittent, severe  Tobacco Use Disorder, continuous,  severe   Rule out personality disorder      Recommendations:  Patient denying SI/HI/AVH but guarded, evasive, inconisistent in her reporting and minimizing recent attempt. She has poor insight and limited judgement and impulse control. Recent susbtance use as well as recent medication changes. Denies history of attempts, identifies support and reasons to live. Would recommend continuing 1:1 for safety. Patient is on legal hold and should be assessed for psychiatric admission once medically stable as she poses danger to self at this time.    Patient reports recent medication changes and states that she noted worsening irritability and mood with pristiq. She is open to SSRI for mood. Could consider prozac 20mg daily vs re trial with sertraline starting at 50mg daily for depressed mood. Patient reports she tolerated sertraline well and states her dose was not maximized and that her recent dose worked well initially and then became less effective over time. Could also consider abilify 5mg daily for depression and mood as an alternative to SSRI. Would obtain baseline metabolic monitoring prior to initiating and monitor routinely. Would avoid bupropion due to patient reporting anxiety and due to patients alcohol use. Would avoid mirtazapine due to patient concern regarding side effects. R/b/se/alt have been reviewed, patient verbalizes understanding. Would discontinue propranolol due to OD. Would discontinue klonopin due to patients alcohol use disorder and potential for dependence and abuse. Could consider PRN hydroxyzine 25mg q8h prn anxiety. Patient reports she has outpatient follow up with couch in [place] but would like alternative as well as outpatient therapy referral.     Encourage abstinence, continue to utilize motivational interviewing to assess readiness for change. Encourage patient to conisder substance use tx. She declines CD tx and declines pharmacalogical tx for AUD. Denies w/d symptoms. Denies history  of complicated withdrawal. Cont CIWA as ordered. Offer NRT and educate regarding hazards of tobacco smoke and nicotine products.        Electronically signed by Devan Macdonald MD, 08/15/23, 3:24 PM EDT.

## 2023-08-15 NOTE — PLAN OF CARE
Goal Outcome Evaluation:      Patient's HR and BP wnl, she denies any suicidal ideation. She continues on IV fluids. Close watch assistant at bedside.

## 2023-08-15 NOTE — SIGNIFICANT NOTE
08/15/23 1240   Behavior WDL   Behavior WDL WDL   Emotion Mood WDL   Emotion/Mood/Affect WDL WDL   Mental Health   Little Interest or Pleasure in Doing Things 3-->nearly every day   Feeling Down, Depressed or Hopeless 3-->nearly every day   Do you feel stress - tense, restless, nervous, or anxious, or unable to sleep at night because your mind is troubled all the time - these days? Very much   Speech WDL   Speech WDL WDL   Perceptual State WDL   Perceptual State WDL WDL   Thought Process WDL   Thought Process WDL WDL   Intellectual Performance WDL   Intellectual Performance WDL WDL   Coping/Stress   Sources of Support parent(s)   C-SSRS (Recent)   Q1 Wished to be Dead (Past Month) yes   Q2 Suicidal Thoughts (Past Month) yes   Q3 Suicidal Thought Method yes   Within the past 3 Months? yes  (During assessment at current patient denying SI)   Violence Risk   Feels Like Hurting Others no   Previous Attempt to Harm Others no     CSW  met with patient at the bedside to complete psychosocial assessment. The father of patient's children at the bedside with her, but it is insinuated that they are not together. Patient states that her emergency contact is her mother Teresita Macdonald. She state she lives with her parents at this time, and she had three children. Patient admitted due intentional overdose. Patient notified SW that she, does not feel she has any major stressors in her life and feels like the switch in her medication possibly caused these issues. Patient does state she is not really depressed, and that she mostly has anxiety, she does state they go hand and hand at times. Patient reports to  that she is having negative thoughts, and is feeling sad and down. She state she has anxiety, and has panic attacks here and there. When asked about substance use/ alcohol use. She reports that she drinks on the weekends. SW asked patient about mental health services, and patient states she sees a  "psychiatrist in [place] at \"  the couch.\" She states she had a virtual meeting last week with them. She does states she would like to start seeing someone locally, but that she does not care for [facility] as she has used them in the past. Patient reports having a good support system, and states she is close with her mom. Pt state she is on Zoloft, and propranolol, for her anxiety and depression. Uncertain DC plan at this time Psych MD to see patient, and make recommendation, however when patient is clear to be discharged patient would like an appointment set up with someone local, There is an [facility] close to where she lives or [facility]. Will continue to follow.      Danii Castillo MSW, CSW   "

## 2023-08-15 NOTE — SIGNIFICANT NOTE
08/15/23 0930   Coping/Psychosocial   Observed Emotional State calm;cooperative   Verbalized Emotional State relief;hopefulness   Trust Relationship/Rapport empathic listening provided   Family/Support Persons mother   Involvement in Care interacting with patient   Additional Documentation Spiritual Care (Group)   Spiritual Care   Use of Spiritual Resources non-Hinduism use of spiritual care   Spiritual Care Source  initiative   Spiritual Care Follow-Up follow-up, none required as presently assessed   Response to Spiritual Care receptive of support   Spiritual Care Interventions supportive conversation provided   Spiritual Care Visit Type initial   Receptivity to Spiritual Care visit welcomed

## 2023-08-16 ENCOUNTER — HOSPITAL ENCOUNTER (INPATIENT)
Facility: HOSPITAL | Age: 38
LOS: 2 days | Discharge: HOME | DRG: 881 | End: 2023-08-18
Attending: PSYCHIATRY & NEUROLOGY | Admitting: PSYCHIATRY & NEUROLOGY
Payer: COMMERCIAL

## 2023-08-16 VITALS
RESPIRATION RATE: 18 BRPM | WEIGHT: 140.87 LBS | DIASTOLIC BLOOD PRESSURE: 92 MMHG | OXYGEN SATURATION: 100 % | HEART RATE: 54 BPM | SYSTOLIC BLOOD PRESSURE: 151 MMHG | TEMPERATURE: 97.7 F | BODY MASS INDEX: 24.05 KG/M2 | HEIGHT: 64 IN

## 2023-08-16 PROBLEM — F32.9 MAJOR DEPRESSION: Status: ACTIVE | Noted: 2023-08-16

## 2023-08-16 LAB
ALBUMIN SERPL-MCNC: 3.7 G/DL (ref 3.5–5.2)
ANION GAP SERPL CALCULATED.3IONS-SCNC: 8.5 MMOL/L (ref 5–15)
BASOPHILS # BLD AUTO: 0.03 10*3/MM3 (ref 0–0.2)
BASOPHILS NFR BLD AUTO: 0.4 % (ref 0–1.5)
BUN SERPL-MCNC: 9 MG/DL (ref 6–20)
BUN/CREAT SERPL: 14.5 (ref 7–25)
CALCIUM SPEC-SCNC: 7.9 MG/DL (ref 8.6–10.5)
CHLORIDE SERPL-SCNC: 108 MMOL/L (ref 98–107)
CO2 SERPL-SCNC: 23.5 MMOL/L (ref 22–29)
CREAT SERPL-MCNC: 0.62 MG/DL (ref 0.57–1)
DEPRECATED RDW RBC AUTO: 43.9 FL (ref 37–54)
EGFRCR SERPLBLD CKD-EPI 2021: 116.3 ML/MIN/1.73
EOSINOPHIL # BLD AUTO: 0.14 10*3/MM3 (ref 0–0.4)
EOSINOPHIL NFR BLD AUTO: 2.1 % (ref 0.3–6.2)
ERYTHROCYTE [DISTWIDTH] IN BLOOD BY AUTOMATED COUNT: 12.9 % (ref 12.3–15.4)
GLUCOSE SERPL-MCNC: 87 MG/DL (ref 65–99)
HCT VFR BLD AUTO: 35.2 % (ref 34–46.6)
HGB BLD-MCNC: 12.2 G/DL (ref 12–15.9)
IMM GRANULOCYTES # BLD AUTO: 0.01 10*3/MM3 (ref 0–0.05)
IMM GRANULOCYTES NFR BLD AUTO: 0.1 % (ref 0–0.5)
LYMPHOCYTES # BLD AUTO: 2.91 10*3/MM3 (ref 0.7–3.1)
LYMPHOCYTES NFR BLD AUTO: 43 % (ref 19.6–45.3)
MAGNESIUM SERPL-MCNC: 1.7 MG/DL (ref 1.6–2.6)
MCH RBC QN AUTO: 32.1 PG (ref 26.6–33)
MCHC RBC AUTO-ENTMCNC: 34.7 G/DL (ref 31.5–35.7)
MCV RBC AUTO: 92.6 FL (ref 79–97)
MONOCYTES # BLD AUTO: 0.66 10*3/MM3 (ref 0.1–0.9)
MONOCYTES NFR BLD AUTO: 9.7 % (ref 5–12)
NEUTROPHILS NFR BLD AUTO: 3.02 10*3/MM3 (ref 1.7–7)
NEUTROPHILS NFR BLD AUTO: 44.7 % (ref 42.7–76)
NRBC BLD AUTO-RTO: 0 /100 WBC (ref 0–0.2)
PHOSPHATE SERPL-MCNC: 2.6 MG/DL (ref 2.5–4.5)
PLATELET # BLD AUTO: 202 10*3/MM3 (ref 140–450)
PMV BLD AUTO: 10.8 FL (ref 6–12)
POTASSIUM SERPL-SCNC: 3.4 MMOL/L (ref 3.5–5.2)
RBC # BLD AUTO: 3.8 10*6/MM3 (ref 3.77–5.28)
SODIUM SERPL-SCNC: 140 MMOL/L (ref 136–145)
WBC NRBC COR # BLD: 6.77 10*3/MM3 (ref 3.4–10.8)

## 2023-08-16 PROCEDURE — 80069 RENAL FUNCTION PANEL: CPT | Performed by: INTERNAL MEDICINE

## 2023-08-16 PROCEDURE — 85025 COMPLETE CBC W/AUTO DIFF WBC: CPT | Performed by: INTERNAL MEDICINE

## 2023-08-16 PROCEDURE — 96361 HYDRATE IV INFUSION ADD-ON: CPT

## 2023-08-16 PROCEDURE — 99239 HOSP IP/OBS DSCHRG MGMT >30: CPT | Performed by: INTERNAL MEDICINE

## 2023-08-16 PROCEDURE — G0378 HOSPITAL OBSERVATION PER HR: HCPCS

## 2023-08-16 PROCEDURE — 83735 ASSAY OF MAGNESIUM: CPT | Performed by: INTERNAL MEDICINE

## 2023-08-16 RX ORDER — DIPHENHYDRAMINE HCL 50 MG
50 CAPSULE ORAL EVERY 4 HOURS PRN
Status: DISCONTINUED | OUTPATIENT
Start: 2023-08-16 | End: 2023-08-18 | Stop reason: HOSPADM

## 2023-08-16 RX ORDER — HALOPERIDOL 5 MG/1
5 TABLET ORAL EVERY 4 HOURS PRN
Status: DISCONTINUED | OUTPATIENT
Start: 2023-08-16 | End: 2023-08-18 | Stop reason: HOSPADM

## 2023-08-16 RX ORDER — HYDROXYZINE HYDROCHLORIDE 25 MG/1
25 TABLET, FILM COATED ORAL 3 TIMES DAILY PRN
Start: 2023-08-16

## 2023-08-16 RX ORDER — MULTIPLE VITAMINS W/ MINERALS TAB 9MG-400MCG
1 TAB ORAL DAILY
Status: DISCONTINUED | OUTPATIENT
Start: 2023-08-16 | End: 2023-08-18 | Stop reason: HOSPADM

## 2023-08-16 RX ORDER — ACETAMINOPHEN 325 MG/1
650 TABLET ORAL EVERY 6 HOURS PRN
Status: DISCONTINUED | OUTPATIENT
Start: 2023-08-16 | End: 2023-08-18 | Stop reason: HOSPADM

## 2023-08-16 RX ORDER — MULTIPLE VITAMINS W/ MINERALS TAB 9MG-400MCG
1 TAB ORAL DAILY
Status: DISCONTINUED | OUTPATIENT
Start: 2023-08-16 | End: 2023-08-16 | Stop reason: HOSPADM

## 2023-08-16 RX ORDER — HALOPERIDOL 5 MG/ML
5 INJECTION INTRAMUSCULAR EVERY 4 HOURS PRN
Status: DISCONTINUED | OUTPATIENT
Start: 2023-08-16 | End: 2023-08-18 | Stop reason: HOSPADM

## 2023-08-16 RX ORDER — LORAZEPAM 2 MG/ML
2 INJECTION INTRAMUSCULAR EVERY 4 HOURS PRN
Status: DISCONTINUED | OUTPATIENT
Start: 2023-08-16 | End: 2023-08-18 | Stop reason: HOSPADM

## 2023-08-16 RX ORDER — FLUOXETINE HYDROCHLORIDE 20 MG/1
20 CAPSULE ORAL DAILY
Status: DISCONTINUED | OUTPATIENT
Start: 2023-08-17 | End: 2023-08-18 | Stop reason: HOSPADM

## 2023-08-16 RX ORDER — HYDROXYZINE HYDROCHLORIDE 25 MG/1
50 TABLET, FILM COATED ORAL EVERY 6 HOURS PRN
Status: DISCONTINUED | OUTPATIENT
Start: 2023-08-16 | End: 2023-08-18 | Stop reason: HOSPADM

## 2023-08-16 RX ORDER — LORAZEPAM 2 MG/1
2 TABLET ORAL EVERY 4 HOURS PRN
Status: DISCONTINUED | OUTPATIENT
Start: 2023-08-16 | End: 2023-08-18 | Stop reason: HOSPADM

## 2023-08-16 RX ORDER — MULTIPLE VITAMINS W/ MINERALS TAB 9MG-400MCG
1 TAB ORAL DAILY
Start: 2023-08-16 | End: 2023-08-18 | Stop reason: HOSPADM

## 2023-08-16 RX ORDER — FLUOXETINE HYDROCHLORIDE 20 MG/1
20 CAPSULE ORAL DAILY
Status: DISCONTINUED | OUTPATIENT
Start: 2023-08-16 | End: 2023-08-16

## 2023-08-16 RX ORDER — FAMOTIDINE 20 MG/1
20 TABLET, FILM COATED ORAL 2 TIMES DAILY PRN
Status: DISCONTINUED | OUTPATIENT
Start: 2023-08-16 | End: 2023-08-18 | Stop reason: HOSPADM

## 2023-08-16 RX ORDER — IBUPROFEN 400 MG/1
400 TABLET ORAL EVERY 6 HOURS PRN
Status: DISCONTINUED | OUTPATIENT
Start: 2023-08-16 | End: 2023-08-18 | Stop reason: HOSPADM

## 2023-08-16 RX ORDER — TRAZODONE HYDROCHLORIDE 50 MG/1
50 TABLET ORAL NIGHTLY PRN
Status: DISCONTINUED | OUTPATIENT
Start: 2023-08-16 | End: 2023-08-18 | Stop reason: HOSPADM

## 2023-08-16 RX ORDER — DIPHENHYDRAMINE HYDROCHLORIDE 50 MG/ML
50 INJECTION INTRAMUSCULAR; INTRAVENOUS EVERY 4 HOURS PRN
Status: DISCONTINUED | OUTPATIENT
Start: 2023-08-16 | End: 2023-08-18 | Stop reason: HOSPADM

## 2023-08-16 RX ORDER — CALCIUM CARBONATE 500 MG/1
2 TABLET, CHEWABLE ORAL ONCE
Status: COMPLETED | OUTPATIENT
Start: 2023-08-16 | End: 2023-08-16

## 2023-08-16 RX ORDER — ALUMINA, MAGNESIA, AND SIMETHICONE 2400; 2400; 240 MG/30ML; MG/30ML; MG/30ML
15 SUSPENSION ORAL EVERY 6 HOURS PRN
Status: DISCONTINUED | OUTPATIENT
Start: 2023-08-16 | End: 2023-08-18 | Stop reason: HOSPADM

## 2023-08-16 RX ORDER — LOPERAMIDE HYDROCHLORIDE 2 MG/1
2 CAPSULE ORAL
Status: DISCONTINUED | OUTPATIENT
Start: 2023-08-16 | End: 2023-08-18 | Stop reason: HOSPADM

## 2023-08-16 RX ADMIN — MULTIPLE VITAMINS W/ MINERALS TAB 1 TABLET: TAB at 10:16

## 2023-08-16 RX ADMIN — HYDROXYZINE HYDROCHLORIDE 50 MG: 25 TABLET, FILM COATED ORAL at 14:18

## 2023-08-16 RX ADMIN — Medication 400 MG: at 10:16

## 2023-08-16 RX ADMIN — SODIUM CHLORIDE 100 ML/HR: 9 INJECTION, SOLUTION INTRAVENOUS at 02:30

## 2023-08-16 RX ADMIN — CALCIUM CARBONATE 2 TABLET: 500 TABLET, CHEWABLE ORAL at 10:16

## 2023-08-16 NOTE — PLAN OF CARE
Problem: Adult Inpatient Plan of Care  Goal: Plan of Care Review  Outcome: Met  Flowsheets (Taken 8/16/2023 1220)  Plan of Care Reviewed With: patient  Outcome Evaluation: Patient has denied any SI. Patient scored 1 on CIWA scale. Patient is alert and oriented. On room air. Patient is being discharged and admitted to [facility].   Goal Outcome Evaluation:  Plan of Care Reviewed With: patient           Outcome Evaluation: Patient has denied any SI. Patient scored 1 on CIWA scale. Patient is alert and oriented. On room air. Patient is being discharged and admitted to [facility].

## 2023-08-16 NOTE — H&P
" [facility]   PSYCHIATRIC  HISTORY AND PHYSICAL    Patient Name: Daiana Macdonald  : 1985  MRN: [id number]  Primary Care Physician:  Provider, No Known  Date of admission: 2023    Subjective   Subjective     Chief Complaint: \"I think was just overwhelmed. I dont know.\"    HPI:     Daiana Macdonald is a 38 y.o. female admitted to medical floor 8/15/23 after presenting to ED with suicide attempt by overdose on \"about 30 propranolol\" tablets mixed with alcohol. Please see consult H and P by this writer dated 8/15/23 for additional details. Patient vague and guarded but reported that she was driving around with her childrens father when she overdosed and impulsively. \[facility] [place] [facility]\"" he contacted 911 immediately. Reports that she had been drinking wine and beer and drank more than usual. She reported being overwhelmed but denied specific stressors. Stated she recently had been transitioned from zoloft to pristiq. States zoloft had \"worn off\" and pristiq caused irritability. \[facility] [place] [facility]\"" outpatient provider stopped pristiq but she was not initiated on alternative.     Per ED note patient was initially normotensive on arrival but developed hypotension in ED and poison control contacted and recommended observation. No complications on medical floor after initial hypotension per report from hospitalist. Patient did not exhibit alcohol withdrawal symptoms.  on admission. UDS negative. Patient denied drug use. She was started on multivitamin and magnesium oxide. Her potassium was noted to be low the morning of transfer to [facility] but no other acute concerns. Hydroxyzine was utilized PRN anxiety. Patients klonopin, propranolol and sertraline she had been on outpatient were discontinued.     Patient placed on 72 HH and after being stabilized medically was transferred to [facility] for continued psychiatric treatment.     On my interview today, patient seen on medical floor prior to transfer. She denies si/hi/avh. Reports " she felt overwhelmed but continues to have difficulty describing her current stressors in detail,. States she slept well, appetite good. States had visit from parents. Reports she wants to go home to see her kids. 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 Identifies support and denies hopelessness. She has not started on new medication but is open to alternative antidepressant and agrees to stabilize on antidepressant prior to discharge though she is hoping to discharge today she is understanding of her legal hold and need to start a new medication for depression and anxiety. States historically hydroxyzine and buspar were not helpful for her anxiety. She remains somewhat guarded today and continues to minimize her suicide attempt.         Review of Systems: A complete 14 point ROS was completed and negative aside from pertinent positives in HPI above.     Personal History     Past Medical History:   Diagnosis Date    Anxiety     Depression     Hypertension        Past Surgical History:   Procedure Laterality Date    FOOT TENDON SURGERY         Past Psychiatric History: Please see consult h and p by this writer dated 8/15/23.      Family History: family history is not on file. Otherwise pertinent FHx was reviewed and not pertinent to current issue.  Please see consult h and p by this writer dated 8/15/23.  Family Psych History: Please see consult h and p by this writer dated 8/15/23.    Family Substance Abuse History:Please see consult h and p by this writer dated 8/15/23.    Family Suicide History:Please see consult h and p by this writer dated 8/15/23.    Social History:   Please see consult h and p by this writer dated 8/15/23.  Social History     Socioeconomic History    Marital status: Single   Tobacco Use    Smoking status: Every Day     Packs/day: 0.50     Types: Cigarettes    Smokeless tobacco: Never   Vaping Use    Vaping  "Use: Never used   Substance and Sexual Activity    Alcohol use: Yes     Alcohol/week: 4.0 standard drinks     Types: 4 Glasses of wine per week    Drug use: Never    Sexual activity: Defer       Substance Abuse History: reports that she has been smoking cigarettes. She has been smoking an average of .5 packs per day. She has never used smokeless tobacco. She reports current alcohol use of about 4.0 standard drinks per week. She reports that she does not use drugs.  Please see consult h and p by this writer dated 8/15/23.    Home Medications:   hydrOXYzine, magnesium oxide, and multivitamin with minerals      Allergies:  No Known Allergies    Objective   Objective     Vitals:   Temp:  [97.3 øF (36.3 øC)-98.1 øF (36.7 øC)] 98.1 øF (36.7 øC)  Heart Rate:  [54-85] 60  Resp:  [18] 18  BP: (132-167)/() 167/95    Physical Exam:   CONSTITUTIONAL: Patient is well developed, well nourished, awake and alert.  HEENT: Head and neck are normocephalic and atraumatic. Pupils equal and  round.  Sclerae clear. No icterus.  LUNGS: Even unlabored respirations.  ABDOMEN: Nondistended.  SKIN: Clean, dry, intact.  EXTREMITIES: No clubbing, cyanosis, edema.  MUSCULOSKELETAL: Symmetric body habitus. Spine straight. Strength intact,  full range of motion.  NEUROLOGIC: Appropriate. No abnormal movements, good muscle tone.    Cranial Nerves:  CN II: Visual fields without deficit.  CN III: Pupils symmetric.  CN III, IV, VI:  Extraocular eye muscles intact, no nystagmus.  CN V: Jaw open and closing normal.  CN VII: Frown and smile symmetric.  CN VIII: Hearing intact.  CN IX, X: Palate rise normal; phonation without hoarseness.  CN XI: Shoulder shrug equal.  CN XII: Tongue midline, no fasciculations, no dysarthria.    Mental Status Exam:        Hygiene: good  Cooperation:  guarded  Eye Contact: decreased  Psychomotor Behavior:  normal  Affect:  depressed,at times incongruent to stated mood  Mood: \"overwhelmed\"  Speech: normal rate, " "depressed tone, normal volume  Language: english fluent   Thought Process: linear and goal directed  Thought Content:  no si/hi/avh, no obsessive thoughts  Suicidal:  denies   Homicidal:  denies  Hallucinations:  denies   Delusion:  none noted   Memory:  in tact   Orientation:  name, hospital, city, day of week   Reliability:  inconsistent reporting,limited due to guardedness   Insight:  limited  Judgement:  fair at present but overall limited based on recent behaviors  Impulse Control: fair at present but recently limited based on recent impulsive suicide attempt      Result Review    Result Review:  I have personally reviewed the results from the time of this admission to 8/16/2023 17:25 EDT and agree with these findings:  [x]  Laboratory  []  Microbiology  []  Radiology  [x]  EKG/Telemetry   []  Cardiology/Vascular   []  Pathology  []  Old records  []  Other:  Most notable findings include: potassium low, uds negative on admission, bal 243 on admission  Qt 416 8/15/23, SR normal ecg    Assessment & Plan   Assessment / Plan     Active Hospital Problems:  Active Hospital Problems    Diagnosis     **Major depression    Alcohol Use Disorder, intermittent, severe  Tobacco Use Disorder, continuous, severe   Rule out personality disorder    Plan:   Transfer to inpatient psychiatry for continued safety and stabilization  Pt is on 72HH, primary team to assess legal status daily  Place patient on suicide precautions. She denies si/hi/avh at present, identifies support, identifies reasons to live, denies hopelessness, no agitation, reports stable employment.  Will start prozac 20mg daily for depression and anxiety. Cont hydroxyzine PRN for anxiety. Titrate medications as indicated and tolerated and monitor closely for adverse effects. Patient has failed trial with pristiq, reportts caused irritability. Past trial with zoloft although reports was not maximized. States it \"wore off.\" Propranolol dc'd due to overdose. " Klonopin dc'd due to AUD and potential for abuse and due to overdose risk.  Encourage development of healthy coping skills including deep breathing.   Encourage healthy diet and routine exercise as tolerated.   Attempt to gain collateral information of possible  Work on safety plan  Provide supportive therapy  Patient to engage in all group and individual treatment modalities available including milieu therapy  Work on appropriate disposition follow-up with social work.  No acute medical concerns, continue vitamin and mag ox. Will order repeat potassium for AM.   Estimated length of stay in hospital 3 to 4 days dependent on development of safety plan and stabilization on psychiatric medication.     Admission Status:  I believe this patient meets criteria for inpatient admission.       Electronically signed by Devan Macdonald MD, 08/16/23, 5:25 PM EDT.

## 2023-08-16 NOTE — CONSULTS
" [facility]   PSYCHIATRIC  HISTORY AND PHYSICAL    Patient Name: Daiana Macdonald  : 1985  MRN: [id number]  Primary Care Physician:  Provider, No Known  Date of admission: 2023    Subjective   Subjective     Chief Complaint: \"I think was just overwhelmed. I dont know.\"    HPI:     Daiana Macdonald is a 38 y.o. female admitted to medical floor 8/15/23 after presenting to ED with suicide attempt by overdose on \"about 30 propranolol\" tablets mixed with alcohol. Please see consult H and P by this writer dated 8/15/23 for additional details. Patient vague and guarded but reported that she was driving around with her childrens father when she overdosed and impulsively. [facility] he contacted 911 immediately. Reports that she had been drinking wine and beer and drank more than usual. She reported being overwhelmed but denied specific stressors. Stated she recently had been transitioned from zoloft to pristiq. States zoloft had \"worn off\" and pristiq caused irritability. [facility] outpatient provider stopped pristiq but she was not initiated on alternative.     Per ED note patient was initially normotensive on arrival but developed hypotension in ED and poison control contacted and recommended observation. No complications on medical floor after initial hypotension per report from hospitalist. Patient did not exhibit alcohol withdrawal symptoms.  on admission. UDS negative. Patient denied drug use. She was started on multivitamin and magnesium oxide. Her potassium was noted to be low the morning of transfer to [facility] but no other acute concerns. Hydroxyzine was utilized PRN anxiety. Patients klonopin, propranolol and sertraline she had been on outpatient were discontinued.     Patient placed on 72 HH and after being stabilized medically was transferred to [facility] for continued psychiatric treatment.     On my interview today, patient seen on medical floor prior to transfer. She denies si/hi/avh. Reports " she felt overwhelmed but continues to have difficulty describing her current stressors in detail,. States she slept well, appetite good. States had visit from parents. Reports she wants to go home to see her kids. 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 Identifies support and denies hopelessness. She has not started on new medication but is open to alternative antidepressant and agrees to stabilize on antidepressant prior to discharge though she is hoping to discharge today she is understanding of her legal hold and need to start a new medication for depression and anxiety. States historically hydroxyzine and buspar were not helpful for her anxiety. She remains somewhat guarded today and continues to minimize her suicide attempt.         Review of Systems: A complete 14 point ROS was completed and negative aside from pertinent positives in HPI above.     Personal History     Past Medical History:   Diagnosis Date    Anxiety     Depression     Hypertension        Past Surgical History:   Procedure Laterality Date    FOOT TENDON SURGERY         Past Psychiatric History: Please see consult h and p by this writer dated 8/15/23.      Family History: family history is not on file. Otherwise pertinent FHx was reviewed and not pertinent to current issue.  Please see consult h and p by this writer dated 8/15/23.  Family Psych History: Please see consult h and p by this writer dated 8/15/23.    Family Substance Abuse History:Please see consult h and p by this writer dated 8/15/23.    Family Suicide History:Please see consult h and p by this writer dated 8/15/23.    Social History:   Please see consult h and p by this writer dated 8/15/23.  Social History     Socioeconomic History    Marital status: Single   Tobacco Use    Smoking status: Every Day     Packs/day: 0.50     Types: Cigarettes    Smokeless tobacco: Never   Vaping Use    Vaping Use:  "Never used   Substance and Sexual Activity    Alcohol use: Yes     Alcohol/week: 4.0 standard drinks     Types: 4 Glasses of wine per week    Drug use: Never    Sexual activity: Defer       Substance Abuse History: reports that she has been smoking cigarettes. She has been smoking an average of .5 packs per day. She has never used smokeless tobacco. She reports current alcohol use of about 4.0 standard drinks per week. She reports that she does not use drugs.  Please see consult h and p by this writer dated 8/15/23.    Home Medications:   hydrOXYzine, magnesium oxide, and multivitamin with minerals      Allergies:  No Known Allergies    Objective   Objective     Vitals:   Temp:  [97.3 øF (36.3 øC)-98.1 øF (36.7 øC)] 98.1 øF (36.7 øC)  Heart Rate:  [54-85] 60  Resp:  [18] 18  BP: (132-167)/() 167/95    Physical Exam:   CONSTITUTIONAL: Patient is well developed, well nourished, awake and alert.  HEENT: Head and neck are normocephalic and atraumatic. Pupils equal and  round.  Sclerae clear. No icterus.  LUNGS: Even unlabored respirations.  ABDOMEN: Nondistended.  SKIN: Clean, dry, intact.  EXTREMITIES: No clubbing, cyanosis, edema.  MUSCULOSKELETAL: Symmetric body habitus. Spine straight. Strength intact,  full range of motion.  NEUROLOGIC: Appropriate. No abnormal movements, good muscle tone.    Cranial Nerves:  CN II: Visual fields without deficit.  CN III: Pupils symmetric.  CN III, IV, VI:  Extraocular eye muscles intact, no nystagmus.  CN V: Jaw open and closing normal.  CN VII: Frown and smile symmetric.  CN VIII: Hearing intact.  CN IX, X: Palate rise normal; phonation without hoarseness.  CN XI: Shoulder shrug equal.  CN XII: Tongue midline, no fasciculations, no dysarthria.    Mental Status Exam:        Hygiene: good  Cooperation:  guarded  Eye Contact: decreased  Psychomotor Behavior:  normal  Affect:  depressed,at times incongruent to stated mood  Mood: \"overwhelmed\"  Speech: normal rate, depressed " "tone, normal volume  Language: english fluent   Thought Process: linear and goal directed  Thought Content:  no si/hi/avh, no obsessive thoughts  Suicidal:  denies   Homicidal:  denies  Hallucinations:  denies   Delusion:  none noted   Memory:  in tact   Orientation:  name, hospital, city, day of week   Reliability:  inconsistent reporting,limited due to guardedness   Insight:  limited  Judgement:  fair at present but overall limited based on recent behaviors  Impulse Control: fair at present but recently limited based on recent impulsive suicide attempt      Result Review    Result Review:  I have personally reviewed the results from the time of this admission to 8/16/2023 17:25 EDT and agree with these findings:  [x]  Laboratory  []  Microbiology  []  Radiology  [x]  EKG/Telemetry   []  Cardiology/Vascular   []  Pathology  []  Old records  []  Other:  Most notable findings include: potassium low, uds negative on admission, bal 243 on admission  Qt 416 8/15/23, SR normal ecg    Assessment & Plan   Assessment / Plan     Active Hospital Problems:  Active Hospital Problems    Diagnosis     **Major depression    Alcohol Use Disorder, intermittent, severe  Tobacco Use Disorder, continuous, severe   Rule out personality disorder    Plan:   Transfer to inpatient psychiatry for continued safety and stabilization  Pt is on 72HH, primary team to assess legal status daily  Place patient on suicide precautions. She denies si/hi/avh at present, identifies support, identifies reasons to live, denies hopelessness, no agitation, reports stable employment.  Will start prozac 20mg daily for depression and anxiety. Cont hydroxyzine PRN for anxiety. Titrate medications as indicated and tolerated and monitor closely for adverse effects. Patient has failed trial with pristiq, reportts caused irritability. Past trial with zoloft although reports was not maximized. States it \"wore off.\" Propranolol dc'd due to overdose. Klonopin dc'd due " to AUD and potential for abuse and due to overdose risk.  Encourage development of healthy coping skills including deep breathing.   Encourage healthy diet and routine exercise as tolerated.   Attempt to gain collateral information of possible  Work on safety plan  Provide supportive therapy  Patient to engage in all group and individual treatment modalities available including milieu therapy  Work on appropriate disposition follow-up with social work.  No acute medical concerns, continue vitamin and mag ox. Will order repeat potassium for AM.   Estimated length of stay in hospital 3 to 4 days dependent on development of safety plan and stabilization on psychiatric medication.     Admission Status:  I believe this patient meets criteria for inpatient admission.       Electronically signed by Devan Macdonald MD, 08/16/23, 5:25 PM EDT.

## 2023-08-16 NOTE — NURSING NOTE
37 y/o female pt. Was admitted form 4 mtu to the [facility] unit at 1350, to the services of dr. Miller. Per report pt. Had been drinking and overdosed on 30 propranolol. Pt. On arrival was quiet and cooperative, searched by female staff for sharps and contraband. Pt. Was cooperativ, denied any si/hi/avh and contracted for safety. Per dr. Macdonald pt. Was seen on consult and no need for close watch on [facility]. Pt. Denied any past si, and did not say much about any stress that led to the over dose other that being off her anti depressant for about a month. Pt. Wanted to talk to the doctor about gene testing to find which anti depressant would be best for her. Pt. States she has been on many in the past. Pt. Also report anxiety as a big issue for her. Pt. Was agreeable to take her prn vistaril for anxiety. Pt. Was oriented to the unit and is in her room sitting at this time. Will con't to monitor and provide a safe environment.

## 2023-08-16 NOTE — DISCHARGE SUMMARY
[facility]         HOSPITALIST  DISCHARGE SUMMARY    Patient Name: Daiana Macdonald    : 1985    MRN: [id number]    Date of Admission: 2023  Date of Discharge:  23  Primary Care Physician: Provider, No Known    Consults       Date and Time Order Name Status Description    8/15/2023  5:18 AM Inpatient Psychiatrist Consult      8/15/2023  2:28 AM Inpatient Hospitalist Consult              Final Diagnosis:  Intentional overdose on propranolol  Attempted suicide  Severe anxiety and depression  Hypomagnesemia      Hospital Course     Hospital Course:  38-year-old female with history of severe anxiety and depression who presents after an attempted suicide of approximately 30 of her home propranolol which she reportedly takes for anxiety.  Poison control was contacted and patient did have some hypotension in the ER and patient was observed on the medical floor with psychiatry consult.  Vital signs stabilized and patient was stable for transfer to inpatient psychiatry for further monitoring and initiation of additional therapy for depression.  Patient declined any additional treatment for alcohol use disorder.  Was monitored on [facility] without any withdrawal symptoms.    Patient discharged in stable condition to [facility]    Return precautions and follow up discussed and patient voiced agreement and understanding of treatment plan.     DISCHARGE Follow Up Recommendations for labs and diagnostics:   Would monitor blood pressure in lifesprings and if consistently >SBP of 145 could start on amlodipine 5mg at that time.  Apparently was using her propranolol    CODE STATUS:  Code Status and Medical Interventions:   Ordered at: 08/15/23 0338     Code Status (Patient has no pulse and is not breathing):    CPR (Attempt to Resuscitate)     Medical Interventions (Patient has pulse or is breathing):    Full Support           Day of Discharge     Vital Signs:  Temp:  [97.3 øF (36.3 øC)-98.1 øF (36.7  øC)] 97.7 øF (36.5 øC)  Heart Rate:  [55-85] 65  Resp:  [18] 18  BP: (132-147)/() 132/94    Physical Exam  Gen: awake, resting in bed, conversant breathing Complan room air regular rate and rhythm, extremities perfused, flat affect, denies acute SI HI      Discharge Details        Discharge Medications        New Medications        Instructions Start Date   hydrOXYzine 25 MG tablet  Commonly known as: ATARAX   25 mg, Oral, 3 Times Daily PRN      magnesium oxide 400 tablet tablet  Commonly known as: MAG-OX   400 mg, Oral, Daily      multivitamin with minerals tablet tablet   1 tablet, Oral, Daily             Stop These Medications      clonazePAM 1 MG tablet  Commonly known as: KlonoPIN     propranolol 40 MG tablet  Commonly known as: INDERAL     sertraline 50 MG tablet  Commonly known as: ZOLOFT                Discharge Disposition:  Psychiatric Hospital or Unit (DC - External)    Diet: patient counseled on dietary changes made during hospital and plans to  advance as tolerated     Discharge Activity: advance as tolerated          Pertinent  and/or Most Recent Results       LAB RESULTS:      Lab 08/16/23  0417 08/15/23  0548 08/15/23  0149   WBC 6.77 7.12 8.11   HEMOGLOBIN 12.2 13.7 13.9   HEMATOCRIT 35.2 39.7 39.4   PLATELETS 202 255 253   NEUTROS ABS 3.02 2.93 4.50   IMMATURE GRANS (ABS) 0.01 0.03 0.02   LYMPHS ABS 2.91 3.52* 3.05   MONOS ABS 0.66 0.49 0.40   EOS ABS 0.14 0.10 0.10   MCV 92.6 92.1 91.4         Lab 08/16/23  0417 08/15/23  0548 08/15/23  0149   SODIUM 140 141 140   POTASSIUM 3.4* 4.0 3.9   CHLORIDE 108* 108* 105   CO2 23.5 21.6* 21.3*   ANION GAP 8.5 11.4 13.7   BUN 9 6 5*   CREATININE 0.62 0.63 0.83   EGFR 116.3 115.9 92.1   GLUCOSE 87 104* 98   CALCIUM 7.9* 7.9* 8.2*   MAGNESIUM 1.7 2.0  --    PHOSPHORUS 2.6  --   --          Lab 08/16/23  0417 08/15/23  0149   TOTAL PROTEIN  --  7.6   ALBUMIN 3.7 4.5   GLOBULIN  --  3.1   ALT (SGPT)  --  14   AST (SGOT)  --  16   BILIRUBIN  --  0.3   ALK  PHOS  --  65                     Brief Urine Lab Results  (Last result in the past 365 days)        Color   Clarity   Blood   Leuk Est   Nitrite   Protein   CREAT   Urine HCG        08/14/23 2347 Yellow   Clear   Negative   Trace   Negative   Negative                 Microbiology Results (last 10 days)       ** No results found for the last 240 hours. **            RADIOLOGY:                     Labs Pending at Discharge:        Time spent on Discharge including face to face service:  32 minutes

## 2023-08-17 LAB
ANION GAP SERPL CALCULATED.3IONS-SCNC: 6.4 MMOL/L (ref 5–15)
BUN SERPL-MCNC: 9 MG/DL (ref 6–20)
BUN/CREAT SERPL: 12.5 (ref 7–25)
CALCIUM SPEC-SCNC: 8.1 MG/DL (ref 8.6–10.5)
CHLORIDE SERPL-SCNC: 105 MMOL/L (ref 98–107)
CO2 SERPL-SCNC: 26.6 MMOL/L (ref 22–29)
CREAT SERPL-MCNC: 0.72 MG/DL (ref 0.57–1)
EGFRCR SERPLBLD CKD-EPI 2021: 109.2 ML/MIN/1.73
GLUCOSE SERPL-MCNC: 101 MG/DL (ref 65–99)
POTASSIUM SERPL-SCNC: 3.5 MMOL/L (ref 3.5–5.2)
SODIUM SERPL-SCNC: 138 MMOL/L (ref 136–145)

## 2023-08-17 PROCEDURE — 80048 BASIC METABOLIC PNL TOTAL CA: CPT | Performed by: PSYCHIATRY & NEUROLOGY

## 2023-08-17 RX ADMIN — MAGNESIUM OXIDE TAB 400 MG (241.3 MG ELEMENTAL MG) 400 MG: 400 (241.3 MG) TAB at 09:05

## 2023-08-17 RX ADMIN — FLUOXETINE 20 MG: 20 CAPSULE ORAL at 09:05

## 2023-08-17 RX ADMIN — MULTIPLE VITAMINS W/ MINERALS TAB 1 TABLET: TAB at 09:05

## 2023-08-17 NOTE — PLAN OF CARE
Goal Outcome Evaluation:  Plan of Care Reviewed With: patient  Patient Agreement with Plan of Care: agrees         Prior to falling asleep, spent time in dayroom watching television with other patients. Calm and cooperative, able to make needs known. Denied SI, HI, AVH. Rated anxiety 4/10 and depression 2/10. Contracted for safety. Handed out and reviewed Safety Plan form, still in process. Expressed hope for future and is anticipating the restart of antidepressant medication and finding a therapist close to home, one that she can see regularly. Will continue to monitor and provide safe and therapeutic environment.

## 2023-08-17 NOTE — PLAN OF CARE
Goal Outcome Evaluation:  Plan of Care Reviewed With: patient  Patient Agreement with Plan of Care: agrees         Pt. Reports sleeping well last night and is willing to try the prozac that she was started on. Pt. Is denying any si and contracts for safety. Pt. Is making her needs known and attending groups. Will con't to monitor and provide a safe environment.

## 2023-08-17 NOTE — PROGRESS NOTES
Patient was seen and evaluated today by me. Her chart was reviewed and case discussed with the treatment team. Nursing report was receiving.      LOS: 1 day   Patient Care Team:  Provider, Lillian Known as PCP - General    Chief Complaint:  Depression and anxiety    Subjective     Interval History:     Patient reports some improvement in her mood. She acknowledges being admitted to the hospital after an apparent overdose on Propranolol. She says she is not sure why she took this overdose and maintains she did not actually want to end her life. She denies any suicidal ideation currently. She does acknowledge a history of depression and anxiety and recent symptoms. She confirms she has initiated treatment with Fluoxetine today and says she is interested in genetic testing for medication given she has tried multiple antidepressants in the past with limited benefit. She denies any history of bipolar disorder or manic episodes. She denies any side effects from her current medication. Patient says she is interested in a referral for individual therapy, stating she believes this may be helpful for her to deal with stressors and life challenges. She denies any physical symptoms currently.     Objective     Vital Signs:  Temp:  [97.6 øF (36.4 øC)] 97.6 øF (36.4 øC)  Heart Rate:  [67-80] 80  Resp:  [16] 16  BP: (121-129)/(78-82) 129/82    Exam:  Completed:  completed within view of staff  Mental Status Exam:     Hygiene:   good  Cooperation:  Cooperative  Eye Contact:  Fair  Psychomotor Behavior:  Appropriate  Affect:  Appropriate  Hopelessness: Denies  Speech:  Normal  Goal directed  Thought Content:  Normal  Suicidal:  None  Homicidal:  None  Hallucinations:  None  Delusion:  None  Memory:  Fair  Orientation:  Person, Place, Time, and Situation  Reliability:  Some  Insight:  Some    Results Review:     I reviewed the patient's new clinical results.    Medication Review: Reviewed    Assessment & Plan     Assessment: No  changes  Treatment Plan: No changes  Treatment Plan discussed with: Patient      Major depression    Continue current treatment and monitor tolerability and response. I did review with her the potential r/b/se/a related to treatment with Prozac and Hydroxyzine. Patient expresses understanding and is agreeable with continuing current treatment. I provided patient with brief, supportive therapy. I encouraged her to engage in therapeutic activities on the unit. Collateral information from family may be helpful and important for discharge planning.     I have reviewed and approved the behavioral health treatment plans and problem list. Yes    Plan for disposition Home when stable    Sharri Littlejohn MD  08/17/23  14:05 EDT

## 2023-08-18 VITALS
SYSTOLIC BLOOD PRESSURE: 151 MMHG | BODY MASS INDEX: 24.05 KG/M2 | DIASTOLIC BLOOD PRESSURE: 98 MMHG | WEIGHT: 140.87 LBS | RESPIRATION RATE: 16 BRPM | HEIGHT: 64 IN | TEMPERATURE: 98.1 F | HEART RATE: 66 BPM | OXYGEN SATURATION: 100 %

## 2023-08-18 RX ORDER — FLUOXETINE HYDROCHLORIDE 20 MG/1
20 CAPSULE ORAL DAILY
Qty: 30 CAPSULE | Refills: 0 | Status: SHIPPED | OUTPATIENT
Start: 2023-08-19

## 2023-08-18 RX ORDER — HYDROXYZINE 50 MG/1
50 TABLET, FILM COATED ORAL EVERY 6 HOURS PRN
Qty: 15 TABLET | Refills: 0 | Status: SHIPPED | OUTPATIENT
Start: 2023-08-18 | End: 2023-08-18 | Stop reason: HOSPADM

## 2023-08-18 RX ADMIN — MULTIPLE VITAMINS W/ MINERALS TAB 1 TABLET: TAB at 08:21

## 2023-08-18 RX ADMIN — FLUOXETINE 20 MG: 20 CAPSULE ORAL at 08:21

## 2023-08-18 RX ADMIN — MAGNESIUM OXIDE TAB 400 MG (241.3 MG ELEMENTAL MG) 400 MG: 400 (241.3 MG) TAB at 08:21

## 2023-08-18 NOTE — DISCHARGE SUMMARY
[facility]         DISCHARGE SUMMARY    Patient Name: Daiana Macdonald  : 1985  MRN: [id number]    Date of Admission: 2023  Date of Discharge: 2023  Primary Care Physician: Provider, No Known    Consults       No orders found from 2023 to 2023.            Presenting Problem: Overdosed on propranolol while drinking alcohol /stressed out    Active and Resolved Hospital Problems:  Active Hospital Problems    Diagnosis POA    **Major depression [F32.9] Yes      Resolved Hospital Problems   No resolved problems to display.         Hospital Course     Hospital Course:  Daiana Macdonald is a 38 y.o. female with a history of major depressive disorder and anxiety disorder who was admitted first to the medical floor as a result of overdose on propranolol while drinking alcohol.  Per patient history patient was drinking alcohol with the father of her 2 children and reported sudden worsening of depression and suicidality.  She stated that [place] just written around) as reported upon admission.  She stated that she was not driving.  She states that they parked and she suddenly took about 30 propranolol.  She she stated that she was prescribed propranolol by the couch in [facility].  Patient was quite vague upon providing the initial information.  She stated that she is not sure if she really wanted to kill herself it was just at [place] for moment that all that happened.  She has been off her medication for about month and a half.  It seems like that she has follow-up appointments in [facility] and she was not able to make it.  Her recent change in her medication is also from Zoloft which she had been on for a long time to Pristiq.  She stated that Pristiq made her very irritable and more anxious.  So she was giving different excuses for not taking her medication which can be true.  She however was drinking and also had propranolol with her which was prescribed by the couch and  she took overdose on that.  Patient later on was when transferred to psychiatric unit stated that she feels much better.  Her depression and anxiety has gone down.  She had been started on Prozac 20 mg daily which she is tolerating it well.  Patient has insight into the problem what as she is suffering from.  She also wants to have some appointment here in [facility] rather than going to [place].  She also talked to one of the providers about genetic testing so that she can be on a proper antidepressant which will help her more.  She has tried multiple antidepressant which has not been helpful anymore.  She however at present is on Prozac she is tolerating it well and no adverse effects are reported.  No symptoms of femi or hypomania noticed.  Patient has progressively improved to a point and that she is stable.  Her mother also believes that she is doing fine and is able to come home.  Patient also needs to have an appointment is one-to-one therapy which she believes is very helpful.  At present an appointment will be made for her to continue as an outpatient for her current stresses and challenges.  She can follow-up for genetic testing from there to see that if any other antidepressant will be more helpful to her.  At present she is stable.  She denies any symptoms of depression or anxiety.  She has been eating well her sleep is good.  She denies being suicidal or homicidal she does not believe that she has attempted to kill herself.  She stated it happened just at a spur of the moment and she did that.  I also discussed with her for her alcohol use which she minimizes and stated and that she is not a regular drinker she may be drinking every other week at times.  I however told her that she might want to attend some AA meetings and can go to alcohol treatment program if her alcohol consumption becomes high she is agreeable to that.  Staff reported doing very well.  At present she has no issues on the unit and no  behavior of hurting herself or anybody else she has no self-injurious behavior.  Patient is patient's oldest run out today and she is not willing to sign voluntary and but at present she is stable enough and contracts for safety to be discharged from the hospital.  She is not exhibiting any symptoms that hold can be taken or an MIW can be taken on her.  She has no previous suicidal attempt in the past.  Therefore with the approval of all the staff patient is discharged home on 18 August 2023.  Patient's alcohol level was 243 upon admission.  She tends to minimize her alcohol use which might have contributed to her current situation.  I discussed with her at length and I also encouraged her to go for her alcohol treatment program although at present she is not ready for that but she is ready for one-to-one therapy and psychiatric treatment.        DISCHARGE Follow Up Recommendations for labs and diagnostics: Her follow-up care is at [facility] behavioral health on 29 August 2023 at 1 PM.  Patient was also advised to attend AA meetings and consider some alcohol treatment if her alcohol consumption continues to rise.      Day of Discharge     Vital Signs:  Temp:  [97.9 øF (36.6 øC)-98.1 øF (36.7 øC)] 98.1 øF (36.7 øC)  Heart Rate:  [66-70] 66  Resp:  [16] 16  BP: (151-152)/(94-98) 151/98      Pertinent  and/or Most Recent Results     LAB RESULTS:      Lab 08/16/23  0417 08/15/23  0548 08/15/23  0149   WBC 6.77 7.12 8.11   HEMOGLOBIN 12.2 13.7 13.9   HEMATOCRIT 35.2 39.7 39.4   PLATELETS 202 255 253   NEUTROS ABS 3.02 2.93 4.50   IMMATURE GRANS (ABS) 0.01 0.03 0.02   LYMPHS ABS 2.91 3.52* 3.05   MONOS ABS 0.66 0.49 0.40   EOS ABS 0.14 0.10 0.10   MCV 92.6 92.1 91.4         Lab 08/17/23  0502 08/16/23  0417 08/15/23  0548 08/15/23  0149   SODIUM 138 140 141 140   POTASSIUM 3.5 3.4* 4.0 3.9   CHLORIDE 105 108* 108* 105   CO2 26.6 23.5 21.6* 21.3*   ANION GAP 6.4 8.5 11.4 13.7   BUN 9 9 6 5*   CREATININE 0.72  0.62 0.63 0.83   EGFR 109.2 116.3 115.9 92.1   GLUCOSE 101* 87 104* 98   CALCIUM 8.1* 7.9* 7.9* 8.2*   MAGNESIUM  --  1.7 2.0  --    PHOSPHORUS  --  2.6  --   --          Lab 08/16/23  0417 08/15/23  0149   TOTAL PROTEIN  --  7.6   ALBUMIN 3.7 4.5   GLOBULIN  --  3.1   ALT (SGPT)  --  14   AST (SGOT)  --  16   BILIRUBIN  --  0.3   ALK PHOS  --  65                                     Lab 08/15/23  0149   ETHANOL PCT 0.243   ETHANOL MGDL 243*         Lab 08/14/23  2347   AMPH/METHAM SCREEN, URINE Negative   BENZODIAZEPINE SCREEN, URINE Negative   COCAINE SCREEN, URINE Negative   OPIATES Negative   THC URINE SCREEN Negative   METHADONE SCREEN, URINE Negative     Brief Urine Lab Results  (Last result in the past 365 days)        Color   Clarity   Blood   Leuk Est   Nitrite   Protein   CREAT   Urine HCG        08/14/23 2347 Yellow   Clear   Negative   Trace   Negative   Negative                                       Imaging Results (Last 7 Days)       ** No results found for the last 168 hours. **             Labs Pending at Discharge:           Discharge Details        Discharge Medications        ASK your doctor about these medications        Instructions Start Date   hydrOXYzine 25 MG tablet  Commonly known as: ATARAX   25 mg, Oral, 3 Times Daily PRN      magnesium oxide 400 tablet tablet  Commonly known as: MAG-OX   400 mg, Oral, Daily      multivitamin with minerals tablet tablet   1 tablet, Oral, Daily               No Known Allergies      Discharge Disposition: Patient is discharged home to her mother  Discharge medication Prozac 20 mg daily.  Risk and benefit of medication was discussed with the patient.  She was also told the toxicity that can result by combining illicit drugs or alcohol with the scheduled medication she does express understanding and intends to maintain her sobriety.  Hydroxyzine  25 mg 3 times a day as needed for anxiety      Diet:  Hospital:  Diet Order   Procedures    Diet: Regular/House  Diet; Texture: Regular Texture (IDDSI 7); Fluid Consistency: Thin (IDDSI 0)         Discharge Activity: Ad karmen.      Discharge Condition: Stable.  Patient denies being suicidal or homicidal.  She is alert and oriented x3 she denies having auditory visual hallucination or any paranoia.  Her speech is clear coherent and articulate.  Her thought processes linear and goal-directed thought content no suicidal homicidal ideation.  Speech is clear coherent and articulate.  Insight and judgment is improved impulse control is fairly preserved.    CODE STATUS:  There are no questions and answers to display.         Future Appointments   Date Time Provider Department Center   8/29/2023  1:00 PM Ivette Lance APRN [facility]           Time spent on Discharge including face to face service: 45 minutes     Part of this note may be an electronic transcription/translation of spoken language to printed text using the Dragon dictation system.        Electronically signed by Noy Mccoy MD, 08/18/23, 12:37 PM EDT.

## 2023-08-18 NOTE — PLAN OF CARE
Goal Outcome Evaluation:  Plan of Care Reviewed With: patient  Patient Agreement with Plan of Care: agrees   Pt cooperative, mood stable, relaxed posture noted. Pt withdrawn to room, reading. Pt wants to discharge tomorrow, citing that she doesn't feel suicidal, homicidal and isn't having hallucinations. Pt stated that being on the unit longer is not helpful for her mental health. Pt stated that she spoke with the provider earlier during the day and that the plan was to discharge tomorrow at the end of the original seventy-two hours period from admission to medical unit from the emergency room. Pt pleasant upon approach. Rated anxiety 3, depression 1 and related both to being on the unit. Denied AVH, SI, HI.

## 2023-08-18 NOTE — PLAN OF CARE
Goal Outcome Evaluation:  Plan of Care Reviewed With: patient  Patient Agreement with Plan of Care: agrees  Consent Given to Review Plan with: mother Gardiner  Progress: improving     Patient alert and oriented. Calm and cooperative. Denies SI/HI/AVH. Rates anxiety 2, depression 0. Reports she is ready to go home and see her children and return to work. Safety plan completed by patient.